# Patient Record
Sex: FEMALE | Race: WHITE | NOT HISPANIC OR LATINO | Employment: FULL TIME | URBAN - NONMETROPOLITAN AREA
[De-identification: names, ages, dates, MRNs, and addresses within clinical notes are randomized per-mention and may not be internally consistent; named-entity substitution may affect disease eponyms.]

---

## 2023-06-01 ENCOUNTER — OFFICE VISIT (OUTPATIENT)
Dept: CARDIOLOGY | Facility: CLINIC | Age: 38
End: 2023-06-01

## 2023-06-01 VITALS
SYSTOLIC BLOOD PRESSURE: 98 MMHG | WEIGHT: 155.6 LBS | DIASTOLIC BLOOD PRESSURE: 68 MMHG | BODY MASS INDEX: 27.57 KG/M2 | HEIGHT: 63 IN | HEART RATE: 80 BPM

## 2023-06-01 DIAGNOSIS — F41.9 ANXIETY: ICD-10-CM

## 2023-06-01 DIAGNOSIS — R42 DIZZINESS: ICD-10-CM

## 2023-06-01 DIAGNOSIS — R00.0 SINUS TACHYCARDIA: ICD-10-CM

## 2023-06-01 DIAGNOSIS — R00.2 PALPITATIONS: Primary | ICD-10-CM

## 2023-06-01 DIAGNOSIS — R42 LIGHT HEADEDNESS: ICD-10-CM

## 2023-06-01 DIAGNOSIS — G90.A POTS (POSTURAL ORTHOSTATIC TACHYCARDIA SYNDROME): ICD-10-CM

## 2023-06-01 DIAGNOSIS — R06.02 SHORTNESS OF BREATH: ICD-10-CM

## 2023-06-01 DIAGNOSIS — E66.3 OVERWEIGHT: ICD-10-CM

## 2023-06-01 RX ORDER — SERTRALINE HYDROCHLORIDE 25 MG/1
25 TABLET, FILM COATED ORAL DAILY
COMMUNITY

## 2023-06-01 RX ORDER — METOPROLOL SUCCINATE 25 MG/1
25 TABLET, EXTENDED RELEASE ORAL DAILY
Qty: 90 TABLET | Refills: 3 | Status: SHIPPED | OUTPATIENT
Start: 2023-06-01

## 2023-06-01 RX ORDER — FLUDROCORTISONE ACETATE 0.1 MG/1
0.1 TABLET ORAL DAILY
Qty: 90 TABLET | Refills: 2 | Status: SHIPPED | OUTPATIENT
Start: 2023-06-01

## 2023-06-01 NOTE — PROGRESS NOTES
Chief Complaint   Patient presents with   • Establish Care     Pt is here to establish care,  she is wanting a second opinion.  Pt states she started having symptoms after COVID.  She states her HR was high and she had trouble breathing.  She states at first her PCP thought it was anxiety related.  She states after meds were adjusted for anxiety, it did not help.  She states she is unable to walk short distances without becoming SOB.  She reports CP when her HR increases.  She also states she has unexplained dizziness.     • Cardiac History     Pt starting seeing cardiology ([facility]) in 2022.  She had stress, echo and holter completed.     • Lab Work     Pt's last labs 22.       Cardiac Complaints  Dizziness, SOA, and palpitations      Subjective   Anai Mayers is a 37 y.o. female with palpitations and anxiety. She comes today for referral from PCP after she had seen cardiologist in  with tachycardia and SOA, which developed after COVID infection. Cardiac workup done with other cardiologist showed no signs of ischemia per stress echo, no significant valve concerns, and only rare PAC/PVC on holter monitor. Of note, she was only able to wear her holter for 30 hours as she had a reaction to adhesive. She does report she was instructed much of it may be anxiety related but her symptoms did not improve with anxiety meds. Patient reports labs have not been done since 2022. She admits to continued palpitations and states with activity her heart will often race, she will become light headed, and even the smallest amounts of activity will make her short of breath. With rest, symptoms typically improve.  She admits to trying to limit caffeine intake.        Cardiac History  Past Surgical History:   Procedure Laterality Date   • APPENDECTOMY     • CARDIOVASCULAR STRESS TEST  2022    Stress/echo: EF 55-60%, no ischemia, 96% THR   •  SECTION     • CONVERTED (HISTORICAL) HOLTER  2022     AVG HR 83, rare PAC/PVC, no VT   • ECHO - CONVERTED  03/28/2022    EF 60%, trace MR, pulm HTN   • ENDOMETRIAL ABLATION     • TUBAL ABDOMINAL LIGATION         Current Outpatient Medications   Medication Sig Dispense Refill   • fludrocortisone 0.1 MG tablet Take 1 tablet by mouth Daily. 90 tablet 2   • metoprolol succinate XL (TOPROL-XL) 25 MG 24 hr tablet Take 1 tablet by mouth Daily. 90 tablet 3   • sertraline (ZOLOFT) 25 MG tablet Take 1 tablet by mouth Daily.       No current facility-administered medications for this visit.       Sulfa antibiotics and Codeine    Past Medical History:   Diagnosis Date   • Anxiety    • Hyperlipidemia    • Insomnia        Social History     Socioeconomic History   • Marital status:    Tobacco Use   • Smoking status: Former     Packs/day: 0.50     Years: 10.00     Pack years: 5.00     Types: Cigarettes     Quit date: 2013     Years since quitting: 10.4   • Smokeless tobacco: Never   Vaping Use   • Vaping Use: Never used   Substance and Sexual Activity   • Alcohol use: Never   • Drug use: Never       Family History   Problem Relation Age of Onset   • Crohn's disease Mother    • Ovarian cancer Mother    • Hyperlipidemia Father    • Heart disease Father    • No Known Problems Sister    • Stroke Maternal Uncle    • Lung cancer Paternal Aunt    • No Known Problems Maternal Grandmother    • Cancer Maternal Grandfather    • Heart attack Paternal Grandmother    • Pancreatic cancer Paternal Grandfather        Review of Systems   Constitutional: Positive for malaise/fatigue. Negative for night sweats.   Cardiovascular: Positive for dyspnea on exertion and palpitations. Negative for chest pain, claudication, irregular heartbeat, leg swelling, near-syncope, orthopnea and syncope.   Musculoskeletal: Positive for stiffness. Negative for back pain and joint pain.   Gastrointestinal: Negative for anorexia, heartburn, nausea and vomiting.   Genitourinary: Negative for dysuria, hematuria,  "hesitancy and nocturia.   Neurological: Positive for dizziness and light-headedness.   Psychiatric/Behavioral: Negative for depression and memory loss. The patient is not nervous/anxious.            Objective     BP 98/68 (BP Location: Right arm, Patient Position: Sitting)   Pulse 80   Ht 160 cm (63\")   Wt 70.6 kg (155 lb 9.6 oz)   BMI 27.56 kg/m²     Constitutional:       Appearance: Not in distress.   Eyes:      Pupils: Pupils are equal, round, and reactive to light.   HENT:      Nose: Nose normal.   Pulmonary:      Effort: Pulmonary effort is normal.      Breath sounds: Normal breath sounds.   Cardiovascular:      PMI at left midclavicular line. Normal rate. Regular rhythm.      Murmurs: There is a systolic murmur.   Abdominal:      Palpations: Abdomen is soft.   Musculoskeletal: Normal range of motion.      Cervical back: Normal range of motion and neck supple. Skin:     General: Skin is warm and dry.   Neurological:      Mental Status: Alert.           ECG 12 Lead    Date/Time: 6/1/2023 3:07 PM  Performed by: Eulalia Luis APRN  Authorized by: Eulalia Luis APRN   Comparison: compared with previous ECG from 10/25/2022  Similar to previous ECG  Rhythm: sinus rhythm  BPM: 80  Conduction: incomplete right bundle branch block    Clinical impression: non-specific ECG  Comments: Normal QT                 Diagnoses and all orders for this visit:    1. Palpitations (Primary)  -     Holter Monitor - 72 Hour Up To 15 Days; Future  -     Adult Transthoracic Echo Complete W/ Cont if Necessary Per Protocol; Future  -     Cancel: Treadmill Stress Test; Future  -     CBC (No Diff); Future  -     Comprehensive Metabolic Panel; Future  -     TSH; Future  -     Magnesium; Future  -     High Sensitivity CRP; Future  -     ECG 12 Lead    2. Sinus tachycardia  -     Holter Monitor - 72 Hour Up To 15 Days; Future  -     Cancel: Treadmill Stress Test; Future  -     CBC (No Diff); Future  -     Comprehensive Metabolic " Panel; Future  -     TSH; Future  -     Magnesium; Future  -     High Sensitivity CRP; Future    3. POTS (postural orthostatic tachycardia syndrome)    4. Dizziness  -     Adult Transthoracic Echo Complete W/ Cont if Necessary Per Protocol; Future  -     CBC (No Diff); Future  -     Comprehensive Metabolic Panel; Future  -     TSH; Future  -     Magnesium; Future  -     High Sensitivity CRP; Future    5. Shortness of breath  -     CBC (No Diff); Future  -     Comprehensive Metabolic Panel; Future  -     TSH; Future  -     Magnesium; Future  -     High Sensitivity CRP; Future    6. Light headedness  -     Adult Transthoracic Echo Complete W/ Cont if Necessary Per Protocol; Future    7. Overweight    Other orders  -     metoprolol succinate XL (TOPROL-XL) 25 MG 24 hr tablet; Take 1 tablet by mouth Daily.  Dispense: 90 tablet; Refill: 3  -     fludrocortisone 0.1 MG tablet; Take 1 tablet by mouth Daily.  Dispense: 90 tablet; Refill: 2             Palpitations: Will repeat holter monitor, THR 96%. Will try to have her wear for 7 days. Discussed with patient that adhesive is supposed to be hypoallergenic. Holter from other cardiology failed to show concerns. Will begin toprol therapy at 25mg daily as well as florinef as her symptoms seem to indicate POTs, and BP is low normal today. Adequate fluid intake encouraged. EKG done today showed NSR with normal QT.     Dizziness/light headedness/palpitations: Echo with bubble urged to assess valve areas/LV dysfunction/any shunt that may be causing symptoms. More recommendations to follow.     No recent labs have been done, lab order provided to assess for concerns, more recommendations to follow.    BMI noted at 27.56, good cardiac diet with limited carb, calories, and activity as tolerated advised    6 month follow up advised or sooner if needed.        Problems Addressed this Visit    None  Visit Diagnoses     Palpitations    -  Primary    Relevant Orders    Holter Monitor -  72 Hour Up To 15 Days    Adult Transthoracic Echo Complete W/ Cont if Necessary Per Protocol    CBC (No Diff)    Comprehensive Metabolic Panel    TSH    Magnesium    High Sensitivity CRP    ECG 12 Lead    Sinus tachycardia        Relevant Orders    Holter Monitor - 72 Hour Up To 15 Days    CBC (No Diff)    Comprehensive Metabolic Panel    TSH    Magnesium    High Sensitivity CRP    POTS (postural orthostatic tachycardia syndrome)        Dizziness        Relevant Orders    Adult Transthoracic Echo Complete W/ Cont if Necessary Per Protocol    CBC (No Diff)    Comprehensive Metabolic Panel    TSH    Magnesium    High Sensitivity CRP    Shortness of breath        Relevant Orders    CBC (No Diff)    Comprehensive Metabolic Panel    TSH    Magnesium    High Sensitivity CRP    Light headedness        Relevant Orders    Adult Transthoracic Echo Complete W/ Cont if Necessary Per Protocol    Overweight          Diagnoses       Codes Comments    Palpitations    -  Primary ICD-10-CM: R00.2  ICD-9-CM: 785.1     Sinus tachycardia     ICD-10-CM: R00.0  ICD-9-CM: 427.89     POTS (postural orthostatic tachycardia syndrome)     ICD-10-CM: G90.A  ICD-9-CM: 427.89     Dizziness     ICD-10-CM: R42  ICD-9-CM: 780.4     Shortness of breath     ICD-10-CM: R06.02  ICD-9-CM: 786.05     Light headedness     ICD-10-CM: R42  ICD-9-CM: 780.4     Overweight     ICD-10-CM: E66.3  ICD-9-CM: 278.02                   Electronically signed by Eulalia Luis, TOÑA June 1, 2023 15:34 EDT

## 2023-06-02 ENCOUNTER — PATIENT ROUNDING (BHMG ONLY) (OUTPATIENT)
Dept: CARDIOLOGY | Facility: CLINIC | Age: 38
End: 2023-06-02

## 2023-06-02 NOTE — PROGRESS NOTES
June 2, 2023    Hello, may I speak with Anai Mayers?    My name is Karo LEONARD      I am  with [facility]  [facility]  [address] [place] [address] [phone].    Before we get started may I verify your date of birth? 1985    I am calling to officially welcome you to our practice and ask about your recent visit. Is this a good time to talk? yes    Tell me about your visit with us. What things went well?  Eulalia was awesome outcome was better than any other  cardiologist I have saw in the past        We're always looking for ways to make our patients' experiences even better. Do you have recommendations on ways we may improve?  no great experience     Overall were you satisfied with your first visit to our practice? yes       I appreciate you taking the time to speak with me today. Is there anything else I can do for you? No       Thank you, and have a great day.

## 2023-06-13 ENCOUNTER — LAB (OUTPATIENT)
Dept: LAB | Facility: HOSPITAL | Age: 38
End: 2023-06-13
Payer: COMMERCIAL

## 2023-06-13 ENCOUNTER — HOSPITAL ENCOUNTER (OUTPATIENT)
Dept: CARDIOLOGY | Facility: HOSPITAL | Age: 38
Discharge: HOME | End: 2023-06-13
Payer: COMMERCIAL

## 2023-06-13 DIAGNOSIS — R00.2 PALPITATIONS: ICD-10-CM

## 2023-06-13 DIAGNOSIS — R00.0 SINUS TACHYCARDIA: ICD-10-CM

## 2023-06-13 DIAGNOSIS — R42 LIGHT HEADEDNESS: ICD-10-CM

## 2023-06-13 DIAGNOSIS — R06.02 SHORTNESS OF BREATH: ICD-10-CM

## 2023-06-13 DIAGNOSIS — R42 DIZZINESS: ICD-10-CM

## 2023-06-13 LAB
ALBUMIN SERPL-MCNC: 4 G/DL (ref 3.5–5.2)
ALBUMIN/GLOB SERPL: 1.3 G/DL
ALP SERPL-CCNC: 60 U/L (ref 39–117)
ALT SERPL W P-5'-P-CCNC: 8 U/L (ref 1–33)
ANION GAP SERPL CALCULATED.3IONS-SCNC: 13 MMOL/L (ref 5–15)
AST SERPL-CCNC: 10 U/L (ref 1–32)
BH CV ECHO MEAS - ACS: 1.71 CM
BH CV ECHO MEAS - AO MAX PG: 5.6 MMHG
BH CV ECHO MEAS - AO MEAN PG: 3.2 MMHG
BH CV ECHO MEAS - AO ROOT DIAM: 2.3 CM
BH CV ECHO MEAS - AO V2 MAX: 118.1 CM/SEC
BH CV ECHO MEAS - AO V2 VTI: 26.8 CM
BH CV ECHO MEAS - EDV(CUBED): 47 ML
BH CV ECHO MEAS - EDV(MOD-SP4): 60.9 ML
BH CV ECHO MEAS - EF(MOD-SP4): 56 %
BH CV ECHO MEAS - EF_3D-VOL: 62 %
BH CV ECHO MEAS - ESV(CUBED): 10.4 ML
BH CV ECHO MEAS - ESV(MOD-SP4): 26.8 ML
BH CV ECHO MEAS - FS: 39.6 %
BH CV ECHO MEAS - IVS/LVPW: 0.98 CM
BH CV ECHO MEAS - IVSD: 1.04 CM
BH CV ECHO MEAS - LA DIMENSION: 3.5 CM
BH CV ECHO MEAS - LAT PEAK E' VEL: 14 CM/SEC
BH CV ECHO MEAS - LV DIASTOLIC VOL/BSA (35-75): 35.1 CM2
BH CV ECHO MEAS - LV MASS(C)D: 116.4 GRAMS
BH CV ECHO MEAS - LV SYSTOLIC VOL/BSA (12-30): 15.4 CM2
BH CV ECHO MEAS - LVIDD: 3.6 CM
BH CV ECHO MEAS - LVIDS: 2.18 CM
BH CV ECHO MEAS - LVPWD: 1.06 CM
BH CV ECHO MEAS - MED PEAK E' VEL: 11 CM/SEC
BH CV ECHO MEAS - MV A MAX VEL: 49.3 CM/SEC
BH CV ECHO MEAS - MV DEC SLOPE: 443.5 CM/SEC2
BH CV ECHO MEAS - MV E MAX VEL: 88.3 CM/SEC
BH CV ECHO MEAS - MV E/A: 1.79
BH CV ECHO MEAS - RAP SYSTOLE: 10 MMHG
BH CV ECHO MEAS - RVDD: 2.8 CM
BH CV ECHO MEAS - RVSP: 30.6 MMHG
BH CV ECHO MEAS - SI(MOD-SP4): 19.7 ML/M2
BH CV ECHO MEAS - SV(MOD-SP4): 34.1 ML
BH CV ECHO MEAS - TR MAX PG: 20.6 MMHG
BH CV ECHO MEAS - TR MAX VEL: 226.7 CM/SEC
BH CV ECHO MEASUREMENTS AVERAGE E/E' RATIO: 7.06
BILIRUB SERPL-MCNC: 0.3 MG/DL (ref 0–1.2)
BUN SERPL-MCNC: 6 MG/DL (ref 6–20)
BUN/CREAT SERPL: 9.4 (ref 7–25)
CALCIUM SPEC-SCNC: 9 MG/DL (ref 8.6–10.5)
CHLORIDE SERPL-SCNC: 105 MMOL/L (ref 98–107)
CO2 SERPL-SCNC: 23 MMOL/L (ref 22–29)
CREAT SERPL-MCNC: 0.64 MG/DL (ref 0.57–1)
DEPRECATED RDW RBC AUTO: 44.6 FL (ref 37–54)
EGFRCR SERPLBLD CKD-EPI 2021: 117.6 ML/MIN/1.73
ERYTHROCYTE [DISTWIDTH] IN BLOOD BY AUTOMATED COUNT: 12.6 % (ref 12.3–15.4)
GLOBULIN UR ELPH-MCNC: 3.1 GM/DL
GLUCOSE SERPL-MCNC: 101 MG/DL (ref 65–99)
HCT VFR BLD AUTO: 42.7 % (ref 34–46.6)
HGB BLD-MCNC: 13.5 G/DL (ref 12–15.9)
LEFT ATRIUM VOLUME INDEX: 18.9 ML/M2
MCH RBC QN AUTO: 30.4 PG (ref 26.6–33)
MCHC RBC AUTO-ENTMCNC: 31.6 G/DL (ref 31.5–35.7)
MCV RBC AUTO: 96.2 FL (ref 79–97)
PLATELET # BLD AUTO: 221 10*3/MM3 (ref 140–450)
PMV BLD AUTO: 11 FL (ref 6–12)
POTASSIUM SERPL-SCNC: 3.7 MMOL/L (ref 3.5–5.2)
PROT SERPL-MCNC: 7.1 G/DL (ref 6–8.5)
RBC # BLD AUTO: 4.44 10*6/MM3 (ref 3.77–5.28)
SODIUM SERPL-SCNC: 141 MMOL/L (ref 136–145)
TSH SERPL DL<=0.05 MIU/L-ACNC: 2.79 UIU/ML (ref 0.27–4.2)
WBC NRBC COR # BLD: 5.05 10*3/MM3 (ref 3.4–10.8)

## 2023-06-13 PROCEDURE — 93306 TTE W/DOPPLER COMPLETE: CPT | Performed by: INTERNAL MEDICINE

## 2023-06-13 PROCEDURE — 83735 ASSAY OF MAGNESIUM: CPT | Performed by: NURSE PRACTITIONER

## 2023-06-13 PROCEDURE — 86141 C-REACTIVE PROTEIN HS: CPT | Performed by: NURSE PRACTITIONER

## 2023-06-13 PROCEDURE — 93306 TTE W/DOPPLER COMPLETE: CPT

## 2023-06-13 PROCEDURE — 80050 GENERAL HEALTH PANEL: CPT | Performed by: NURSE PRACTITIONER

## 2023-06-13 RX ORDER — SODIUM CHLORIDE 9 MG/ML
8 INJECTION INTRAMUSCULAR; INTRAVENOUS; SUBCUTANEOUS ONCE AS NEEDED
Status: COMPLETED | OUTPATIENT
Start: 2023-06-13 | End: 2023-06-13

## 2023-06-13 RX ADMIN — SODIUM CHLORIDE 8 ML: 9 INJECTION, SOLUTION INTRAMUSCULAR; INTRAVENOUS; SUBCUTANEOUS at 09:18

## 2023-06-14 LAB
CRP SERPL-MCNC: 0.16 MG/DL (ref 0.01–0.5)
MAGNESIUM SERPL-MCNC: 2 MG/DL (ref 1.6–2.6)